# Patient Record
Sex: FEMALE | Race: OTHER
[De-identification: names, ages, dates, MRNs, and addresses within clinical notes are randomized per-mention and may not be internally consistent; named-entity substitution may affect disease eponyms.]

---

## 2019-03-21 ENCOUNTER — HOSPITAL ENCOUNTER (OUTPATIENT)
Dept: HOSPITAL 61 - SURG | Age: 39
Discharge: HOME | End: 2019-03-21
Attending: OBSTETRICS & GYNECOLOGY
Payer: COMMERCIAL

## 2019-03-21 VITALS — WEIGHT: 164 LBS | HEIGHT: 69 IN | BODY MASS INDEX: 24.29 KG/M2

## 2019-03-21 VITALS — SYSTOLIC BLOOD PRESSURE: 114 MMHG | DIASTOLIC BLOOD PRESSURE: 59 MMHG

## 2019-03-21 DIAGNOSIS — N72: ICD-10-CM

## 2019-03-21 DIAGNOSIS — Z82.49: ICD-10-CM

## 2019-03-21 DIAGNOSIS — N87.0: Primary | ICD-10-CM

## 2019-03-21 DIAGNOSIS — Z98.51: ICD-10-CM

## 2019-03-21 DIAGNOSIS — Z83.3: ICD-10-CM

## 2019-03-21 DIAGNOSIS — Z83.49: ICD-10-CM

## 2019-03-21 DIAGNOSIS — E78.5: ICD-10-CM

## 2019-03-21 LAB — U PREG PATIENT: NEGATIVE

## 2019-03-21 PROCEDURE — A7015 AEROSOL MASK USED W NEBULIZE: HCPCS

## 2019-03-21 PROCEDURE — 57520 CONIZATION OF CERVIX: CPT

## 2019-03-21 PROCEDURE — 88307 TISSUE EXAM BY PATHOLOGIST: CPT

## 2019-03-21 PROCEDURE — 81025 URINE PREGNANCY TEST: CPT

## 2019-03-21 NOTE — PDOC
BRIEF OPERATIVE NOTE


Date:  Mar 21, 2019


Pre-Op Diagnosis


CHERYL 2


Post-Op Diagnosis


Same


Procedure Performed


Cervical Cone Biopsy


Surgeon


Dr. Steel


Anesthesia Type:  General


Blood Loss


Less than 5 ml


Specimens Obtained


cervical cone bx


Findings


CHERYL 2


Complications


none


Operative Note


see dictation











NILAM STEEL Jr, MD Mar 21, 2019 14:23

## 2019-03-21 NOTE — DISCH
DISCHARGE INSTRUCTIONS


Condition on Discharge


Condition on Discharge:  Stable





Activity After Discharge


Activity Instructions for Disc:  Activity as tolerated


Lifting Instructions after Dis:  No heavy lifting


Driving Instructions after Dis:  Do not drive today





Diet after Discharge


Diet after Discharge:  Regular





Contacting the DRIsabelle after DC


Call your doctor for:  Concerns you may have





Follow-Up


Follow up with:  Dr. Steel in 2 weeks.











NILAM STEEL Jr, MD Mar 21, 2019 14:24

## 2019-03-21 NOTE — OP
DATE OF SURGERY:  



PREOPERATIVE DIAGNOSIS:  Cervical intraepithelial neoplasia 2.



POSTOPERATIVE DIAGNOSIS:  Cervical intraepithelial neoplasia 2.



PROCEDURE:  Cervical cone biopsy.



SURGEON:  Nilam Steel MD



ANESTHESIA:  LMA.



ESTIMATED BLOOD LOSS:  Less than 5 mL.



COMPLICATIONS:  None.



FINDINGS:  CHERYL 2.



SUMMARY:  A 38-year-old female with CHERYL 2 on colposcopic biopsy, requiring

cervical cone biopsy.  The patient was counseled on risks, benefits and

expectations and voiced clear standing to proceed.



DESCRIPTION OF PROCEDURE:  The patient was taken to surgery suite and placed in

dorsal lithotomy position.  She was prepped with Betadine solution and draped in

sterile fashion.  After adequate anesthesia, weighted speculum and curved Cris

placed vaginally.  The anterior lip of the cervix grasped with a single-tooth

tenaculum.  2-0 Vicryl sutures placed at 3 o'clock and 9 o'clock position for

better stabilization of the cervix.  The cervix was injected with 1% lidocaine

with epinephrine in a circumferential manner.  Cervical cone biopsy was then

performed with cold knife scalpel, removing a portion of the anterior and

posterior lip of the cervix, remaining cervix was cauterized with ball cautery

and Monsel solution was also applied for better hemostasis.  The weighted

speculum and curved [place] were removed as well as the single-tooth tenaculum. 

The patient tolerated the procedure well and was taken to recovery room in

stable condition.  Sponge and needle counts correct x 3.

 



______________________________

NILAM STEEL MD



DR:  TERRY/woody  JOB#:  [id number] / [id number]

DD:  03/21/2019 14:23  DT:  03/21/2019 14:47

## 2019-03-25 NOTE — PATHOLOGY
[facility] Accession Number: [id number]

.                                                                01

Material submitted:                                        .

CERVICAL CONE BIOPSY

.                                                                01

Clinical history:                                          .

Cervical dysplasia.

.                                                                02

**********************************************************************

Diagnosis:

Uterine cervix, cervical cone biopsy:

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

- Exocervical, endocervical and deep endocervical margins negative for

CHERYL.

- Hypercornification of exocervix, focal.

- Chronic cervicitis with focal squamous metaplasia.

- Focal endocervical stromal fibrosis and hemosiderin laden macrophages

consistent with previous biopsy site.

LBQ/03/22/2019

**********************************************************************

.                                                                02

Comment:

There is no high grade dysplasia or evidence of malignancy.

(JPM/db; 3/22/2019)

.                                                                02

Electronically signed:                                     .

David Laird MD, Pathologist

NPI- [id number]

.                                                                01

Gross description:                                         .

Received fresh labeled "Kathy Cramer, cervical cone biopsy stitch at

12:00" is an oriented cervical conization specimen measuring 2.8 x 2.0 cm

and excised to a maximum depth of 1.5 cm.  The ectocervix is tan-white and

smooth with a centrally located slitlike os measuring 1.9 cm.  The

ectocervical margin is inked green and the deep margin is inked black.

The specimen is radially sectioned and submitted entirely in cassettes as

follows:

A1 12:00 to 3:00

A2-A3 3:00 to 6:00

A4-A5 6:00 to 9:00

A6 9:00 to 12:00 ([facility]; 3/21/2019)

SYC/SYC

.                                                                02

Pathologist provided ICD-10:

N87.0, N72

.                                                                02

CPT                                                        .

144047

Specimen Comment: A courtesy copy of this report has been sent to

Specimen Comment: [phone].

Specimen Comment: Report sent to 

***Performed at:  01

   [facility]  [id number]

   MD Chemo Page MD Phone:  [phone]

***Performed at:  02

   [facility]

   [address] [place] [address]  [id number]

   MD David Laird MD Phone:  [phone]